# Patient Record
Sex: MALE | Race: ASIAN | NOT HISPANIC OR LATINO | Employment: OTHER | URBAN - METROPOLITAN AREA
[De-identification: names, ages, dates, MRNs, and addresses within clinical notes are randomized per-mention and may not be internally consistent; named-entity substitution may affect disease eponyms.]

---

## 2019-06-18 ENCOUNTER — HOSPITAL ENCOUNTER (OUTPATIENT)
Dept: LAB | Facility: MEDICAL CENTER | Age: 74
End: 2019-06-18
Attending: INTERNAL MEDICINE

## 2019-06-18 ENCOUNTER — OFFICE VISIT (OUTPATIENT)
Dept: INTERNAL MEDICINE | Facility: MEDICAL CENTER | Age: 74
End: 2019-06-18

## 2019-06-18 VITALS
OXYGEN SATURATION: 91 % | HEART RATE: 122 BPM | TEMPERATURE: 98.2 F | SYSTOLIC BLOOD PRESSURE: 102 MMHG | DIASTOLIC BLOOD PRESSURE: 71 MMHG

## 2019-06-18 DIAGNOSIS — I10 ESSENTIAL HYPERTENSION: ICD-10-CM

## 2019-06-18 DIAGNOSIS — R00.0 TACHYCARDIA: ICD-10-CM

## 2019-06-18 DIAGNOSIS — M1A.00X0 IDIOPATHIC CHRONIC GOUT WITHOUT TOPHUS, UNSPECIFIED SITE: ICD-10-CM

## 2019-06-18 DIAGNOSIS — G93.31 POSTVIRAL FATIGUE SYNDROME: ICD-10-CM

## 2019-06-18 DIAGNOSIS — J30.1 NON-SEASONAL ALLERGIC RHINITIS DUE TO POLLEN: ICD-10-CM

## 2019-06-18 DIAGNOSIS — H81.23 VESTIBULAR NEURONITIS OF BOTH EARS: ICD-10-CM

## 2019-06-18 PROCEDURE — 36415 COLL VENOUS BLD VENIPUNCTURE: CPT

## 2019-06-18 PROCEDURE — 80053 COMPREHEN METABOLIC PANEL: CPT

## 2019-06-18 PROCEDURE — 93000 ELECTROCARDIOGRAM COMPLETE: CPT | Mod: GC | Performed by: STUDENT IN AN ORGANIZED HEALTH CARE EDUCATION/TRAINING PROGRAM

## 2019-06-18 PROCEDURE — 99214 OFFICE O/P EST MOD 30 MIN: CPT | Mod: GC | Performed by: INTERNAL MEDICINE

## 2019-06-18 PROCEDURE — 85025 COMPLETE CBC W/AUTO DIFF WBC: CPT

## 2019-06-18 RX ORDER — FEBUXOSTAT 40 MG/1
TABLET, FILM COATED ORAL 2 TIMES DAILY
COMMUNITY

## 2019-06-18 RX ORDER — RAMIPRIL 5 MG/1
5 CAPSULE ORAL DAILY
COMMUNITY

## 2019-06-19 LAB
ALBUMIN SERPL BCP-MCNC: 4.4 G/DL (ref 3.2–4.9)
ALBUMIN/GLOB SERPL: 1.2 G/DL
ALP SERPL-CCNC: 57 U/L (ref 30–99)
ALT SERPL-CCNC: 14 U/L (ref 2–50)
ANION GAP SERPL CALC-SCNC: 11 MMOL/L (ref 0–11.9)
AST SERPL-CCNC: 23 U/L (ref 12–45)
BASOPHILS # BLD AUTO: 0.8 % (ref 0–1.8)
BASOPHILS # BLD: 0.1 K/UL (ref 0–0.12)
BILIRUB SERPL-MCNC: 0.7 MG/DL (ref 0.1–1.5)
BUN SERPL-MCNC: 43 MG/DL (ref 8–22)
CALCIUM SERPL-MCNC: 9.5 MG/DL (ref 8.5–10.5)
CHLORIDE SERPL-SCNC: 96 MMOL/L (ref 96–112)
CO2 SERPL-SCNC: 20 MMOL/L (ref 20–33)
CREAT SERPL-MCNC: 2.65 MG/DL (ref 0.5–1.4)
EOSINOPHIL # BLD AUTO: 0.1 K/UL (ref 0–0.51)
EOSINOPHIL NFR BLD: 0.8 % (ref 0–6.9)
ERYTHROCYTE [DISTWIDTH] IN BLOOD BY AUTOMATED COUNT: 40.6 FL (ref 35.9–50)
GLOBULIN SER CALC-MCNC: 3.8 G/DL (ref 1.9–3.5)
GLUCOSE SERPL-MCNC: 108 MG/DL (ref 65–99)
HCT VFR BLD AUTO: 45.5 % (ref 42–52)
HGB BLD-MCNC: 14.1 G/DL (ref 14–18)
IMM GRANULOCYTES # BLD AUTO: 0.07 K/UL (ref 0–0.11)
IMM GRANULOCYTES NFR BLD AUTO: 0.6 % (ref 0–0.9)
LYMPHOCYTES # BLD AUTO: 1.89 K/UL (ref 1–4.8)
LYMPHOCYTES NFR BLD: 14.9 % (ref 22–41)
MCH RBC QN AUTO: 26.3 PG (ref 27–33)
MCHC RBC AUTO-ENTMCNC: 31 G/DL (ref 33.7–35.3)
MCV RBC AUTO: 84.9 FL (ref 81.4–97.8)
MONOCYTES # BLD AUTO: 1.93 K/UL (ref 0–0.85)
MONOCYTES NFR BLD AUTO: 15.2 % (ref 0–13.4)
NEUTROPHILS # BLD AUTO: 8.57 K/UL (ref 1.82–7.42)
NEUTROPHILS NFR BLD: 67.7 % (ref 44–72)
NRBC # BLD AUTO: 0 K/UL
NRBC BLD-RTO: 0 /100 WBC
PLATELET # BLD AUTO: 244 K/UL (ref 164–446)
PMV BLD AUTO: 10.7 FL (ref 9–12.9)
POTASSIUM SERPL-SCNC: 5.2 MMOL/L (ref 3.6–5.5)
PROT SERPL-MCNC: 8.2 G/DL (ref 6–8.2)
RBC # BLD AUTO: 5.36 M/UL (ref 4.7–6.1)
SODIUM SERPL-SCNC: 127 MMOL/L (ref 135–145)
WBC # BLD AUTO: 12.7 K/UL (ref 4.8–10.8)

## 2019-06-19 NOTE — PROGRESS NOTES
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  Patient reported feeling markedly tired.  However he declined any chest pain, severe shortness of breath or exertional angina or exertional dyspnea.  His grandson was also sick recently from upper respiratory infection.      He has history of hypertension, previously treated with beta-blockers currently on ramipril 5 mg daily.  He never had any coronary artery disease in the past nor reported any history of stroke or diabetes.  He was evaluated in [place] for coronary artery disease last year and  ECHO  was normal with an ejection fraction of 65%    Patient also has history of chronic gout with last uric acid level of 10.2 a year ago, his allopurinol was changed to febuxostat 40 mg twice daily after he was diagnosed to have CKD.  2 years ago  Last episode of gouty arthritis was 2 years ago.    Personal history  He is non-smoker, nonalcoholic, does not abuse any drugs.    Health maintenance history  Not sure if he had pneumonia vaccination.  He is a non-smoker and has never been screened for abdominal aortic aneurysm  Not sure about colonoscopy        Patient Active Problem List    Diagnosis Date Noted   • Gout 01/01/2013   • HTN (hypertension) 01/01/2000       Past Medical History:   Diagnosis Date   • CKD (chronic kidney disease) 2017    Diagnosed 2-3 yrs ago in [place]   • Gout 2013    Has been on Allopurinol changed to feboxustat after CKD 2 yrs ago   • HTN (hypertension) 2000    Has tried Atenolol and Metoprollo in past and Ramipril 5 mg currently       Current Outpatient Prescriptions   Medication Sig Dispense Refill    • ramipril (ALTACE) 5 MG Cap Take 5 mg by mouth every day.     • febuxostat (ULORIC) 40 MG Tab Take  by mouth 2 Times a Day.       No current facility-administered medications for this visit.        Allergies as of 06/18/2019   • (No Known Allergies)       Social History     Social History   • Marital status:      Spouse name: N/A   • Number of children: N/A   • Years of education: N/A     Occupational History   • retired      Social History Main Topics   • Smoking status: Never Smoker   • Smokeless tobacco: Never Used   • Alcohol use No   • Drug use: No   • Sexual activity: Not on file     Other Topics Concern   • Not on file     Social History Narrative    Lives in [facility],     visiting daughter in [place]       Family History   Problem Relation Age of Onset   • Family history unknown: Yes       History reviewed. No pertinent surgical history.    ROS: As per HPI. Additional pertinent symptoms as noted below.    Negative for chest pain  Negative for shortness of breath  Negative for vomiting, diarrhea  Negative for joint pains or swelling of feet    Positive for nasal discharge and sneezing  Positive for nausea      /71 (BP Location: Left arm, Patient Position: Sitting, BP Cuff Size: Adult)   Pulse (!) 122   Temp 36.8 °C (98.2 °F) (Temporal)   SpO2 91%     Physical Exam    General: Average built elderly male, afebrile, anicteric , slightly uncomfortable with rhinorrhea and nasal discharge       Eyes: Pupils equal and reactive. No scleral icterus.    Throat: Severe congestion of the pharynx present but no follicles or exudates   No jugulodigastric lymph nodes  Otoscopy showed bilateral normal tympanic membrane light reflexes  No perforation or any signs of eustachian catarrh    Neck: Supple. No lymphadenopathy noted. Thyroid not enlarged.    Lungs: Clear to auscultation and percussion bilaterally.  No percussion dullness or increase in vocal fremitus  No wheezing heard    Cardiovascular: Tachycardic  heart sounds   No S3 or S4 heard   No murmurs, rubs or gallops.  No jugular venous distention    Abdomen: No shifting dullness   No rebound or guarding noted.    Extremities: No clubbing, cyanosis, edema.    Skin: Clear. No rash or suspicious skin lesions noted.    Other: Mood and affect normal    Assessment and Plan    1. Postviral fatigue syndrome  2. Non-seasonal allergic rhinitis due to pollen  History of sneezing and cold 5 days  History of feverish feeling with chills associated with nausea and loss of appetite  History of sick contact with grand child  Clinical examination shows rhinitis with pharyngitis  Chest bilaterally clear  Remaining exam unremarkable  Likely post viral fatigue syndrome    Plan:  CBC, CMP  Patient advised to remain well-hydrated, take vitamin water      3. Tachycardia  4. Essential hypertension  Patient found to be tachycardic on examination although he declined any palpitations or blurry vision  Blood pressure was at lower side with 102/71 oxygen saturation at 91%  EKG sinus tachycardia with a heart rate of 112 and normal QTc interval of 391    Imp:  Likely patient is over treated for his hypertension  Unlikely to be pulmonary embolism or pneumonia    Plan:  For now patient is advised to withhold ramipril, possibly may need to cut down to half tablet daily once his blood pressure recovers to normal.  Continue monitoring BP and Heart rate at home  CBC, CMP today  Do a chest x-ray if needed.      5. Idiopathic chronic gout without tophus, unspecified site  6. CKD  Hx of  chronic gout with last uric acid level of 10.2 a year ago,  Allopurinol was changed to febuxostat 40 mg twice daily after he was diagnosed to have CKD.  2 years ago  Last episode of gouty arthritis was 2 years ago.        Risk Assessment (discuss potential complications a function of chronic problems): As above    Complexity (discuss number of co-morbidities): As above    Signed by: Luis Manuel Gibbons M.D.

## 2019-06-20 ENCOUNTER — TELEPHONE (OUTPATIENT)
Dept: INTERNAL MEDICINE | Facility: MEDICAL CENTER | Age: 74
End: 2019-06-20

## 2019-06-20 DIAGNOSIS — B96.89 BACTERIAL SINUSITIS: ICD-10-CM

## 2019-06-20 DIAGNOSIS — N18.30 CKD (CHRONIC KIDNEY DISEASE) STAGE 3, GFR 30-59 ML/MIN (HCC): ICD-10-CM

## 2019-06-20 DIAGNOSIS — J32.9 BACTERIAL SINUSITIS: ICD-10-CM

## 2019-06-20 RX ORDER — PREDNISONE 50 MG/1
50 TABLET ORAL DAILY
Qty: 5 TAB | Refills: 0 | Status: SHIPPED | OUTPATIENT
Start: 2019-06-20 | End: 2019-06-25

## 2019-06-25 ENCOUNTER — HOSPITAL ENCOUNTER (OUTPATIENT)
Dept: LAB | Facility: MEDICAL CENTER | Age: 74
End: 2019-06-25
Attending: INTERNAL MEDICINE

## 2019-06-25 DIAGNOSIS — N18.30 CKD (CHRONIC KIDNEY DISEASE) STAGE 3, GFR 30-59 ML/MIN (HCC): ICD-10-CM

## 2019-06-25 LAB
ANION GAP SERPL CALC-SCNC: 9 MMOL/L (ref 0–11.9)
APPEARANCE UR: CLEAR
BACTERIA #/AREA URNS HPF: NEGATIVE /HPF
BILIRUB UR QL STRIP.AUTO: NEGATIVE
BUN SERPL-MCNC: 53 MG/DL (ref 8–22)
CALCIUM SERPL-MCNC: 8.8 MG/DL (ref 8.5–10.5)
CHLORIDE SERPL-SCNC: 96 MMOL/L (ref 96–112)
CO2 SERPL-SCNC: 21 MMOL/L (ref 20–33)
COLOR UR: YELLOW
CREAT SERPL-MCNC: 2.05 MG/DL (ref 0.5–1.4)
EPI CELLS #/AREA URNS HPF: NEGATIVE /HPF
FASTING STATUS PATIENT QL REPORTED: NORMAL
GLUCOSE SERPL-MCNC: 104 MG/DL (ref 65–99)
GLUCOSE UR STRIP.AUTO-MCNC: NEGATIVE MG/DL
HYALINE CASTS #/AREA URNS LPF: ABNORMAL /LPF
KETONES UR STRIP.AUTO-MCNC: NEGATIVE MG/DL
LEUKOCYTE ESTERASE UR QL STRIP.AUTO: NEGATIVE
MICRO URNS: ABNORMAL
NITRITE UR QL STRIP.AUTO: NEGATIVE
PH UR STRIP.AUTO: 5.5 [PH]
POTASSIUM SERPL-SCNC: 4.8 MMOL/L (ref 3.6–5.5)
PROT UR QL STRIP: 30 MG/DL
RBC # URNS HPF: ABNORMAL /HPF
RBC UR QL AUTO: ABNORMAL
SODIUM SERPL-SCNC: 126 MMOL/L (ref 135–145)
SP GR UR STRIP.AUTO: 1.01
UROBILINOGEN UR STRIP.AUTO-MCNC: 0.2 MG/DL
WBC #/AREA URNS HPF: ABNORMAL /HPF

## 2019-06-25 PROCEDURE — 81001 URINALYSIS AUTO W/SCOPE: CPT

## 2019-06-25 PROCEDURE — 80048 BASIC METABOLIC PNL TOTAL CA: CPT

## 2019-06-25 PROCEDURE — 36415 COLL VENOUS BLD VENIPUNCTURE: CPT

## 2021-01-15 DIAGNOSIS — Z23 NEED FOR VACCINATION: ICD-10-CM
